# Patient Record
Sex: FEMALE | Race: WHITE | NOT HISPANIC OR LATINO | URBAN - METROPOLITAN AREA
[De-identification: names, ages, dates, MRNs, and addresses within clinical notes are randomized per-mention and may not be internally consistent; named-entity substitution may affect disease eponyms.]

---

## 2022-06-27 ENCOUNTER — APPOINTMENT (OUTPATIENT)
Dept: URBAN - METROPOLITAN AREA CLINIC 195 | Age: 67
Setting detail: DERMATOLOGY
End: 2022-07-07

## 2022-06-27 VITALS
DIASTOLIC BLOOD PRESSURE: 80 MMHG | RESPIRATION RATE: 20 BRPM | SYSTOLIC BLOOD PRESSURE: 150 MMHG | HEART RATE: 70 BPM | TEMPERATURE: 97.8 F

## 2022-06-27 VITALS
WEIGHT: 160 LBS | RESPIRATION RATE: 16 BRPM | DIASTOLIC BLOOD PRESSURE: 84 MMHG | TEMPERATURE: 98.2 F | HEART RATE: 74 BPM | HEIGHT: 65 IN | SYSTOLIC BLOOD PRESSURE: 154 MMHG

## 2022-06-27 DIAGNOSIS — L57.8 OTHER SKIN CHANGES DUE TO CHRONIC EXPOSURE TO NONIONIZING RADIATION: ICD-10-CM

## 2022-06-27 DIAGNOSIS — Z12.83 ENCOUNTER FOR SCREENING FOR MALIGNANT NEOPLASM OF SKIN: ICD-10-CM

## 2022-06-27 DIAGNOSIS — L90.5 SCAR CONDITIONS AND FIBROSIS OF SKIN: ICD-10-CM

## 2022-06-27 PROCEDURE — OTHER COUNSELING: OTHER

## 2022-06-27 PROCEDURE — 88331 PATH CONSLTJ SURG 1 BLK 1SPC: CPT

## 2022-06-27 PROCEDURE — OTHER SURGICAL DECISION MAKING: OTHER

## 2022-06-27 PROCEDURE — OTHER MIPS QUALITY: OTHER

## 2022-06-27 PROCEDURE — 99204 OFFICE O/P NEW MOD 45 MIN: CPT | Mod: 25

## 2022-06-27 PROCEDURE — OTHER INCISIONAL BIOPSY WITH FROZEN SECTION: OTHER

## 2022-06-27 PROCEDURE — OTHER ASC CONSULTATION: OTHER

## 2022-06-27 PROCEDURE — 11755 BIOPSY NAIL UNIT: CPT

## 2022-06-27 ASSESSMENT — LOCATION ZONE DERM: LOCATION ZONE: FINGERNAIL

## 2022-06-27 ASSESSMENT — LOCATION DETAILED DESCRIPTION DERM: LOCATION DETAILED: RIGHT INDEX FINGERNAIL

## 2022-06-27 ASSESSMENT — LOCATION SIMPLE DESCRIPTION DERM: LOCATION SIMPLE: RIGHT INDEX FINGERNAIL

## 2022-06-27 NOTE — HPI: PROCEDURE (MOHS)
Has The Growth Been Previously Biopsied?: has not been previously biopsied
Additional History: This lesion was bx and treated approx 1.5 yrs ago , no recent bx was taken .
Body Location Override (Optional): R 2nd finger

## 2022-06-27 NOTE — PROCEDURE: INCISIONAL BIOPSY WITH FROZEN SECTION
Body Location Override (Optional - Billing Will Still Be Based On Selected Body Map Location If Applicable): R 2nd finger